# Patient Record
Sex: FEMALE | Race: WHITE | Employment: FULL TIME | URBAN - METROPOLITAN AREA
[De-identification: names, ages, dates, MRNs, and addresses within clinical notes are randomized per-mention and may not be internally consistent; named-entity substitution may affect disease eponyms.]

---

## 2018-01-29 ENCOUNTER — HOSPITAL ENCOUNTER (OUTPATIENT)
Dept: MRI IMAGING | Age: 28
Discharge: HOME | End: 2018-01-29
Attending: UROLOGY
Payer: COMMERCIAL

## 2018-01-29 DIAGNOSIS — N28.1 RENAL CYST, RIGHT: ICD-10-CM

## 2018-01-29 PROCEDURE — 74011250636 HC RX REV CODE- 250/636: Performed by: UROLOGY

## 2018-01-29 PROCEDURE — A9576 INJ PROHANCE MULTIPACK: HCPCS | Performed by: UROLOGY

## 2018-01-29 PROCEDURE — 74183 MRI ABD W/O CNTR FLWD CNTR: CPT

## 2018-01-29 RX ADMIN — GADOTERIDOL 15 ML: 279.3 INJECTION, SOLUTION INTRAVENOUS at 17:48

## 2018-01-29 NOTE — PROGRESS NOTES
This RN called to MRI for IV placement     IV established 22 gauge in L hand RN unable to document in pt chart as outpatient.

## 2018-01-30 NOTE — PROGRESS NOTES
Myah Coats called the MRI department today at 5:36pm with a concern regarding her I.V. Injection that was performed during the MRI of the Abdomen WO and With Contrast yesterday. She discussed at length that she was concerned about an area along the route of injection and in her left hand that could have been infiltrated or irritated by the contrast injection causing her pain 24 hours after the test. I spoke with her about going to the emergency room to get the area of concern checked tonight but she said [place] too far away. I placed her on hold and spoke with Katie-Radiology RN who also emphasized that at this point the patient should go to an emergency room to be seen. The patient had not taken anything for pain since leaving the MRI department yesterday. By the end of the call she still didn't confirm if she would be going to the emergency room or plan to follow-up with her physician. While she was in MRI the first part of her test was done without contrast. A total of three attempts to obtain intravenous access were performed by myself and Chandrika-Radiology RN. Ms. Colten Zavala stated she didn't want to leave without the contrast injection and wanted someone to obtain intravenous access. A successful I.V. placement was performed by Pablo (ER) via the left hand. A saline bolus of 10mL at .75mL/sec was performed with the injector. There was no infiltrate although the patient complained of pain in the left hand. She stated she could feel the saline moving down her arm. Her hands were cold and an area in the center of her hand was very cold to the touch. This area was where her she stated the pain was. There was no discoloration, infiltration, or other concerns of unsafe injection. The patient still wanted to proceed. The 14. 3mL gadolinium was administered at a half rate of .75mL/sec. and 15mL saline flush at a half rate of .75mL/sec.  The gadolinium bolus was detected in the aorta by SmartPrep tracker and was well visualized which indicated that it traveled via vascular system to the optimal area for imaging without incident. The exam was completed and the patient was released without further concern.